# Patient Record
Sex: MALE | Race: WHITE | NOT HISPANIC OR LATINO | Employment: FULL TIME | URBAN - METROPOLITAN AREA
[De-identification: names, ages, dates, MRNs, and addresses within clinical notes are randomized per-mention and may not be internally consistent; named-entity substitution may affect disease eponyms.]

---

## 2019-03-29 ENCOUNTER — EH NON-PROVIDER (OUTPATIENT)
Dept: OCCUPATIONAL MEDICINE | Facility: CLINIC | Age: 29
End: 2019-03-29

## 2019-03-29 ENCOUNTER — HOSPITAL ENCOUNTER (OUTPATIENT)
Facility: MEDICAL CENTER | Age: 29
End: 2019-03-29
Attending: PHYSICIAN ASSISTANT
Payer: COMMERCIAL

## 2019-03-29 DIAGNOSIS — Z02.1 PRE-EMPLOYMENT HEALTH SCREENING EXAMINATION: ICD-10-CM

## 2019-03-29 DIAGNOSIS — Z02.1 PRE-EMPLOYMENT DRUG SCREENING: ICD-10-CM

## 2019-03-29 LAB
AMP AMPHETAMINE: NORMAL
BAR BARBITURATES: NORMAL
BZO BENZODIAZEPINES: NORMAL
COC COCAINE: NORMAL
INT CON NEG: NORMAL
INT CON POS: NORMAL
MDMA ECSTASY: NORMAL
MET METHAMPHETAMINES: NORMAL
MTD METHADONE: NORMAL
OPI OPIATES: NORMAL
OXY OXYCODONE: NORMAL
PCP PHENCYCLIDINE: NORMAL
POC URINE DRUG SCREEN OCDRS: NORMAL
THC: NORMAL

## 2019-03-29 PROCEDURE — 90746 HEPB VACCINE 3 DOSE ADULT IM: CPT | Performed by: PHYSICIAN ASSISTANT

## 2019-03-29 PROCEDURE — 90686 IIV4 VACC NO PRSV 0.5 ML IM: CPT | Performed by: PHYSICIAN ASSISTANT

## 2019-03-29 PROCEDURE — 86765 RUBEOLA ANTIBODY: CPT | Performed by: PHYSICIAN ASSISTANT

## 2019-03-29 PROCEDURE — 86762 RUBELLA ANTIBODY: CPT | Performed by: PHYSICIAN ASSISTANT

## 2019-03-29 PROCEDURE — 90715 TDAP VACCINE 7 YRS/> IM: CPT | Performed by: PHYSICIAN ASSISTANT

## 2019-03-29 PROCEDURE — 80305 DRUG TEST PRSMV DIR OPT OBS: CPT | Performed by: PHYSICIAN ASSISTANT

## 2019-03-29 PROCEDURE — 86735 MUMPS ANTIBODY: CPT | Performed by: PHYSICIAN ASSISTANT

## 2019-03-29 PROCEDURE — 86787 VARICELLA-ZOSTER ANTIBODY: CPT | Performed by: PHYSICIAN ASSISTANT

## 2019-03-30 LAB
MEV IGG SER IA-ACNC: 0.31
MUV IGG SER IA-ACNC: 1.03
RUBV AB SER QL: 102.9 IU/ML
VZV IGG SER IA-ACNC: 1.2

## 2019-03-30 NOTE — NON-PROVIDER
"Drug Screen.  Titers Drawn Today: MMr, VZV  Administered Today's Visit: T-dap, Flu, Hep B  quantiferon needed. Pt was stuck 4x times all times patient would get super pale and start felling\" queasy\" so I had to stop venipuncture. I was only able to complete the mmr and vzv titer. Pt did not want to continue asked if he can come back, appt made for Monday morning, advised to drinks lots of water and avoid caffeine. Ok per niranjan       "

## 2019-04-01 ENCOUNTER — EH NON-PROVIDER (OUTPATIENT)
Dept: OCCUPATIONAL MEDICINE | Facility: CLINIC | Age: 29
End: 2019-04-01

## 2019-04-01 ENCOUNTER — HOSPITAL ENCOUNTER (OUTPATIENT)
Facility: MEDICAL CENTER | Age: 29
End: 2019-04-01
Attending: PREVENTIVE MEDICINE
Payer: COMMERCIAL

## 2019-04-01 DIAGNOSIS — Z02.89 ENCOUNTER FOR OCCUPATIONAL HEALTH EXAMINATION: ICD-10-CM

## 2019-04-01 PROCEDURE — 86480 TB TEST CELL IMMUN MEASURE: CPT | Performed by: PREVENTIVE MEDICINE

## 2019-04-01 PROCEDURE — 90707 MMR VACCINE SC: CPT | Performed by: PREVENTIVE MEDICINE

## 2019-04-03 LAB
GAMMA INTERFERON BACKGROUND BLD IA-ACNC: 0.05 IU/ML
M TB IFN-G BLD-IMP: NEGATIVE
M TB IFN-G CD4+ BCKGRND COR BLD-ACNC: 0 IU/ML
MITOGEN IGNF BCKGRD COR BLD-ACNC: >10 IU/ML
QFT TB2 - NIL TBQ2: -0.01 IU/ML

## 2021-05-10 ENCOUNTER — OFFICE VISIT (OUTPATIENT)
Dept: URGENT CARE | Facility: CLINIC | Age: 31
End: 2021-05-10
Payer: COMMERCIAL

## 2021-05-10 VITALS
RESPIRATION RATE: 20 BRPM | HEIGHT: 70 IN | OXYGEN SATURATION: 97 % | HEART RATE: 97 BPM | SYSTOLIC BLOOD PRESSURE: 120 MMHG | TEMPERATURE: 98 F | BODY MASS INDEX: 26.05 KG/M2 | WEIGHT: 182 LBS | DIASTOLIC BLOOD PRESSURE: 80 MMHG

## 2021-05-10 DIAGNOSIS — J98.01 BRONCHOSPASM: ICD-10-CM

## 2021-05-10 DIAGNOSIS — J98.8 RTI (RESPIRATORY TRACT INFECTION): ICD-10-CM

## 2021-05-10 PROCEDURE — 99203 OFFICE O/P NEW LOW 30 MIN: CPT | Performed by: PHYSICIAN ASSISTANT

## 2021-05-10 RX ORDER — ALBUTEROL SULFATE 90 UG/1
2 AEROSOL, METERED RESPIRATORY (INHALATION) EVERY 6 HOURS PRN
Qty: 8.5 G | Refills: 0 | Status: SHIPPED | OUTPATIENT
Start: 2021-05-10

## 2021-05-10 RX ORDER — AZITHROMYCIN 250 MG/1
TABLET, FILM COATED ORAL
Qty: 6 TABLET | Refills: 0 | Status: SHIPPED | OUTPATIENT
Start: 2021-05-10

## 2021-05-10 RX ORDER — METHYLPREDNISOLONE 4 MG/1
TABLET ORAL
Qty: 21 TABLET | Refills: 0 | Status: SHIPPED | OUTPATIENT
Start: 2021-05-10

## 2021-05-10 ASSESSMENT — ENCOUNTER SYMPTOMS
GASTROINTESTINAL NEGATIVE: 1
CARDIOVASCULAR NEGATIVE: 1
MYALGIAS: 0
RHINORRHEA: 1
CHILLS: 0
FEVER: 0
COUGH: 1
SHORTNESS OF BREATH: 0
SPUTUM PRODUCTION: 1
SORE THROAT: 0
WHEEZING: 0

## 2021-05-11 NOTE — PROGRESS NOTES
Subjective:      Maycol Moon is a 30 y.o. male who presents with Cough (Pt states he has had for over two weeks and wont go away.)            Productive cough with shortness of breath and congestion for the last 3 weeks.  Already had negative Covid test.  Started with constitutional symptoms with those have resolved.  Denies vomiting or diarrhea.    Cough  This is a new problem. The current episode started 1 to 4 weeks ago. The problem has been unchanged. The problem occurs every few minutes. The cough is productive of sputum. Associated symptoms include nasal congestion, postnasal drip and rhinorrhea. Pertinent negatives include no chest pain, chills, ear pain, fever, myalgias, sore throat, shortness of breath or wheezing. The symptoms are aggravated by lying down. He has tried OTC cough suppressant (OTC allergy) for the symptoms. The treatment provided mild relief. His past medical history is significant for environmental allergies. There is no history of asthma or pneumonia.       PMH:  has no past medical history on file.  MEDS: No current outpatient medications on file.  ALLERGIES: Not on File  SURGHX: History reviewed. No pertinent surgical history.  SOCHX:  reports that he has never smoked. He has never used smokeless tobacco. He reports previous alcohol use. He reports previous drug use.  FH: family history is not on file.    Review of Systems   Constitutional: Negative for chills, fever and malaise/fatigue.   HENT: Positive for congestion, postnasal drip and rhinorrhea. Negative for ear pain and sore throat.    Respiratory: Positive for cough and sputum production. Negative for shortness of breath and wheezing.    Cardiovascular: Negative.  Negative for chest pain and leg swelling.   Gastrointestinal: Negative.    Musculoskeletal: Negative for myalgias.   Endo/Heme/Allergies: Positive for environmental allergies.       Medications, Allergies, and current problem list reviewed today in Epic     Objective:  "    /80 (BP Location: Right arm, Patient Position: Sitting, BP Cuff Size: Adult)   Pulse 97   Temp 36.7 °C (98 °F) (Temporal)   Resp 20   Ht 1.778 m (5' 10\")   Wt 82.6 kg (182 lb)   SpO2 97%   BMI 26.11 kg/m²      Physical Exam  Vitals and nursing note reviewed.   Constitutional:       General: He is not in acute distress.     Appearance: Normal appearance. He is well-developed. He is not ill-appearing, toxic-appearing or diaphoretic.   HENT:      Head: Normocephalic and atraumatic.      Right Ear: Tympanic membrane, ear canal and external ear normal.      Left Ear: Tympanic membrane, ear canal and external ear normal.      Nose: Congestion present. No rhinorrhea.      Mouth/Throat:      Mouth: Mucous membranes are moist.      Pharynx: Oropharynx is clear. No oropharyngeal exudate or posterior oropharyngeal erythema.   Eyes:      General:         Right eye: No discharge.         Left eye: No discharge.      Conjunctiva/sclera: Conjunctivae normal.   Cardiovascular:      Rate and Rhythm: Normal rate and regular rhythm.      Pulses: Normal pulses.      Heart sounds: Normal heart sounds. No murmur.   Pulmonary:      Effort: Pulmonary effort is normal. No respiratory distress.      Breath sounds: Normal breath sounds. No wheezing, rhonchi or rales.   Chest:      Chest wall: No tenderness.   Musculoskeletal:         General: No swelling or tenderness.      Cervical back: Normal range of motion and neck supple.      Right lower leg: No edema.      Left lower leg: No edema.   Lymphadenopathy:      Cervical: No cervical adenopathy.   Skin:     General: Skin is warm and dry.   Neurological:      Mental Status: He is alert and oriented to person, place, and time.   Psychiatric:         Mood and Affect: Mood normal.         Behavior: Behavior normal.         Thought Content: Thought content normal.         Judgment: Judgment normal.                 Assessment/Plan:     1. RTI (respiratory tract infection)  " methylPREDNISolone (MEDROL DOSEPAK) 4 MG Tablet Therapy Pack    albuterol 108 (90 Base) MCG/ACT Aero Soln inhalation aerosol    azithromycin (ZITHROMAX) 250 MG Tab   2. Bronchospasm  methylPREDNISolone (MEDROL DOSEPAK) 4 MG Tablet Therapy Pack    albuterol 108 (90 Base) MCG/ACT Aero Soln inhalation aerosol     Persistent productive cough with bronchospasm and mild shortness of breath.  Started with constitutional symptoms 2 to 3 weeks ago but those have resolved.  No vomiting or diarrhea.  Already had negative Covid test.  No history of asthma or pneumonia.  PO2 adequate.  Lungs clear bilateral wheezes rhonchi rales.  Symptoms consistent with a respiratory tract infection.  We will be treating based on duration.  Continue OTC allergy.  OTC meds and conservative measures as discussed    Return to clinic or go to ED if symptoms worsen or persist. Indications for ED discussed at length. Patient/Parent/Guardian voices understanding. Follow-up with your primary care provider in 3-5 days. Red flag symptoms discussed. All side effects of medication discussed including allergic response, GI upset, tendon injury, rash, sedation etc.    Please note that this dictation was created using voice recognition software. I have made every reasonable attempt to correct obvious errors, but I expect that there are errors of grammar and possibly content that I did not discover before finalizing the note.

## 2023-10-04 ENCOUNTER — APPOINTMENT (RX ONLY)
Dept: URBAN - METROPOLITAN AREA CLINIC 6 | Facility: CLINIC | Age: 33
Setting detail: DERMATOLOGY
End: 2023-10-04

## 2023-10-04 DIAGNOSIS — Z71.89 OTHER SPECIFIED COUNSELING: ICD-10-CM

## 2023-10-04 DIAGNOSIS — L24 IRRITANT CONTACT DERMATITIS: ICD-10-CM | Status: INADEQUATELY CONTROLLED

## 2023-10-04 PROBLEM — L24.9 IRRITANT CONTACT DERMATITIS, UNSPECIFIED CAUSE: Status: ACTIVE | Noted: 2023-10-04

## 2023-10-04 PROCEDURE — ? PRESCRIPTION MEDICATION MANAGEMENT

## 2023-10-04 PROCEDURE — 99204 OFFICE O/P NEW MOD 45 MIN: CPT

## 2023-10-04 PROCEDURE — ? COUNSELING

## 2023-10-04 PROCEDURE — ? SUNSCREEN TREATMENT REGIMEN

## 2023-10-04 PROCEDURE — ? PRESCRIPTION

## 2023-10-04 RX ORDER — TRIAMCINOLONE ACETONIDE 1 MG/G
1 OINTMENT TOPICAL BID
Qty: 30 | Refills: 3 | Status: ERX | COMMUNITY
Start: 2023-10-04

## 2023-10-04 RX ORDER — TACROLIMUS 1 MG/G
1 OINTMENT TOPICAL BID
Qty: 30 | Refills: 3 | Status: ERX | COMMUNITY
Start: 2023-10-04

## 2023-10-04 RX ADMIN — TACROLIMUS 1: 1 OINTMENT TOPICAL at 00:00

## 2023-10-04 RX ADMIN — TRIAMCINOLONE ACETONIDE 1: 1 OINTMENT TOPICAL at 00:00

## 2023-10-04 ASSESSMENT — LOCATION DETAILED DESCRIPTION DERM
LOCATION DETAILED: LEFT AXILLARY VAULT
LOCATION DETAILED: RIGHT ULNAR DORSAL HAND
LOCATION DETAILED: LEFT ULNAR DORSAL HAND
LOCATION DETAILED: LEFT FOREHEAD
LOCATION DETAILED: RIGHT ANTERIOR PROXIMAL UPPER ARM
LOCATION DETAILED: LEFT ANTERIOR PROXIMAL UPPER ARM
LOCATION DETAILED: RIGHT AXILLARY VAULT

## 2023-10-04 ASSESSMENT — LOCATION ZONE DERM
LOCATION ZONE: HAND
LOCATION ZONE: ARM
LOCATION ZONE: FACE
LOCATION ZONE: AXILLAE

## 2023-10-04 ASSESSMENT — LOCATION SIMPLE DESCRIPTION DERM
LOCATION SIMPLE: LEFT HAND
LOCATION SIMPLE: LEFT FOREHEAD
LOCATION SIMPLE: RIGHT UPPER ARM
LOCATION SIMPLE: LEFT AXILLARY VAULT
LOCATION SIMPLE: LEFT UPPER ARM
LOCATION SIMPLE: RIGHT HAND
LOCATION SIMPLE: RIGHT AXILLARY VAULT

## 2023-10-04 NOTE — PROCEDURE: PRESCRIPTION MEDICATION MANAGEMENT
Detail Level: Zone
Render In Strict Bullet Format?: No
Plan: Recommended the Vanicream line for sensitive skin\\nConsider patch testing if rash continues to recur
Initiate Treatment: Triamcinolone Ointment x 10 days, then Tacrolimus Ointment BID until improved